# Patient Record
Sex: MALE | Race: WHITE | NOT HISPANIC OR LATINO | Employment: PART TIME | URBAN - METROPOLITAN AREA
[De-identification: names, ages, dates, MRNs, and addresses within clinical notes are randomized per-mention and may not be internally consistent; named-entity substitution may affect disease eponyms.]

---

## 2023-12-11 ENCOUNTER — APPOINTMENT (OUTPATIENT)
Dept: CARDIOLOGY | Facility: HOSPITAL | Age: 28
End: 2023-12-11
Payer: COMMERCIAL

## 2023-12-11 ENCOUNTER — APPOINTMENT (OUTPATIENT)
Dept: RADIOLOGY | Facility: HOSPITAL | Age: 28
End: 2023-12-11
Payer: COMMERCIAL

## 2023-12-11 ENCOUNTER — HOSPITAL ENCOUNTER (EMERGENCY)
Facility: HOSPITAL | Age: 28
Discharge: HOME | End: 2023-12-11
Attending: STUDENT IN AN ORGANIZED HEALTH CARE EDUCATION/TRAINING PROGRAM
Payer: COMMERCIAL

## 2023-12-11 VITALS
DIASTOLIC BLOOD PRESSURE: 92 MMHG | BODY MASS INDEX: 44.1 KG/M2 | OXYGEN SATURATION: 98 % | TEMPERATURE: 98.4 F | WEIGHT: 315 LBS | RESPIRATION RATE: 18 BRPM | HEIGHT: 71 IN | SYSTOLIC BLOOD PRESSURE: 175 MMHG | HEART RATE: 84 BPM

## 2023-12-11 DIAGNOSIS — R07.9 CHEST PAIN, UNSPECIFIED TYPE: Primary | ICD-10-CM

## 2023-12-11 LAB
ALBUMIN SERPL BCP-MCNC: 3.8 G/DL (ref 3.4–5)
ALP SERPL-CCNC: 99 U/L (ref 33–120)
ALT SERPL W P-5'-P-CCNC: 19 U/L (ref 10–52)
ANION GAP SERPL CALC-SCNC: 8 MMOL/L (ref 10–20)
AST SERPL W P-5'-P-CCNC: 13 U/L (ref 9–39)
BASOPHILS # BLD AUTO: 0.04 X10*3/UL (ref 0–0.1)
BASOPHILS NFR BLD AUTO: 0.3 %
BILIRUB SERPL-MCNC: 0.7 MG/DL (ref 0–1.2)
BUN SERPL-MCNC: 10 MG/DL (ref 6–23)
CALCIUM SERPL-MCNC: 8.9 MG/DL (ref 8.6–10.3)
CARDIAC TROPONIN I PNL SERPL HS: 4 NG/L (ref 0–20)
CARDIAC TROPONIN I PNL SERPL HS: 4 NG/L (ref 0–20)
CHLORIDE SERPL-SCNC: 102 MMOL/L (ref 98–107)
CO2 SERPL-SCNC: 31 MMOL/L (ref 21–32)
CREAT SERPL-MCNC: 0.83 MG/DL (ref 0.5–1.3)
EOSINOPHIL # BLD AUTO: 0.15 X10*3/UL (ref 0–0.7)
EOSINOPHIL NFR BLD AUTO: 1.3 %
ERYTHROCYTE [DISTWIDTH] IN BLOOD BY AUTOMATED COUNT: 12.8 % (ref 11.5–14.5)
GFR SERPL CREATININE-BSD FRML MDRD: >90 ML/MIN/1.73M*2
GLUCOSE SERPL-MCNC: 105 MG/DL (ref 74–99)
HCT VFR BLD AUTO: 41.8 % (ref 41–52)
HGB BLD-MCNC: 14.1 G/DL (ref 13.5–17.5)
IMM GRANULOCYTES # BLD AUTO: 0.05 X10*3/UL (ref 0–0.7)
IMM GRANULOCYTES NFR BLD AUTO: 0.4 % (ref 0–0.9)
LYMPHOCYTES # BLD AUTO: 2.49 X10*3/UL (ref 1.2–4.8)
LYMPHOCYTES NFR BLD AUTO: 21.5 %
MAGNESIUM SERPL-MCNC: 1.77 MG/DL (ref 1.6–2.4)
MCH RBC QN AUTO: 28.8 PG (ref 26–34)
MCHC RBC AUTO-ENTMCNC: 33.7 G/DL (ref 32–36)
MCV RBC AUTO: 85 FL (ref 80–100)
MONOCYTES # BLD AUTO: 0.59 X10*3/UL (ref 0.1–1)
MONOCYTES NFR BLD AUTO: 5.1 %
NEUTROPHILS # BLD AUTO: 8.24 X10*3/UL (ref 1.2–7.7)
NEUTROPHILS NFR BLD AUTO: 71.4 %
NRBC BLD-RTO: 0 /100 WBCS (ref 0–0)
PLATELET # BLD AUTO: 279 X10*3/UL (ref 150–450)
POTASSIUM SERPL-SCNC: 3.2 MMOL/L (ref 3.5–5.3)
PROT SERPL-MCNC: 7.4 G/DL (ref 6.4–8.2)
RBC # BLD AUTO: 4.9 X10*6/UL (ref 4.5–5.9)
SODIUM SERPL-SCNC: 138 MMOL/L (ref 136–145)
WBC # BLD AUTO: 11.6 X10*3/UL (ref 4.4–11.3)

## 2023-12-11 PROCEDURE — 85025 COMPLETE CBC W/AUTO DIFF WBC: CPT | Performed by: STUDENT IN AN ORGANIZED HEALTH CARE EDUCATION/TRAINING PROGRAM

## 2023-12-11 PROCEDURE — 71045 X-RAY EXAM CHEST 1 VIEW: CPT | Performed by: RADIOLOGY

## 2023-12-11 PROCEDURE — 2500000001 HC RX 250 WO HCPCS SELF ADMINISTERED DRUGS (ALT 637 FOR MEDICARE OP): Performed by: STUDENT IN AN ORGANIZED HEALTH CARE EDUCATION/TRAINING PROGRAM

## 2023-12-11 PROCEDURE — 71045 X-RAY EXAM CHEST 1 VIEW: CPT | Mod: FY

## 2023-12-11 PROCEDURE — 36415 COLL VENOUS BLD VENIPUNCTURE: CPT | Performed by: STUDENT IN AN ORGANIZED HEALTH CARE EDUCATION/TRAINING PROGRAM

## 2023-12-11 PROCEDURE — 83735 ASSAY OF MAGNESIUM: CPT | Performed by: STUDENT IN AN ORGANIZED HEALTH CARE EDUCATION/TRAINING PROGRAM

## 2023-12-11 PROCEDURE — 99283 EMERGENCY DEPT VISIT LOW MDM: CPT | Mod: 25

## 2023-12-11 PROCEDURE — 84484 ASSAY OF TROPONIN QUANT: CPT | Performed by: STUDENT IN AN ORGANIZED HEALTH CARE EDUCATION/TRAINING PROGRAM

## 2023-12-11 PROCEDURE — 80053 COMPREHEN METABOLIC PANEL: CPT | Performed by: STUDENT IN AN ORGANIZED HEALTH CARE EDUCATION/TRAINING PROGRAM

## 2023-12-11 PROCEDURE — 93005 ELECTROCARDIOGRAM TRACING: CPT

## 2023-12-11 RX ORDER — ALUMINUM HYDROXIDE, MAGNESIUM HYDROXIDE, AND SIMETHICONE 1200; 120; 1200 MG/30ML; MG/30ML; MG/30ML
20 SUSPENSION ORAL ONCE
Status: COMPLETED | OUTPATIENT
Start: 2023-12-11 | End: 2023-12-11

## 2023-12-11 RX ORDER — POTASSIUM CHLORIDE 1.5 G/1.58G
20 POWDER, FOR SOLUTION ORAL ONCE
Status: COMPLETED | OUTPATIENT
Start: 2023-12-11 | End: 2023-12-11

## 2023-12-11 RX ADMIN — ALUMINUM HYDROXIDE, MAGNESIUM HYDROXIDE, AND SIMETHICONE 20 ML: 200; 200; 20 SUSPENSION ORAL at 20:23

## 2023-12-11 RX ADMIN — POTASSIUM CHLORIDE 20 MEQ: 1.5 POWDER, FOR SOLUTION ORAL at 20:20

## 2023-12-11 ASSESSMENT — PAIN - FUNCTIONAL ASSESSMENT: PAIN_FUNCTIONAL_ASSESSMENT: 0-10

## 2023-12-11 ASSESSMENT — COLUMBIA-SUICIDE SEVERITY RATING SCALE - C-SSRS
6. HAVE YOU EVER DONE ANYTHING, STARTED TO DO ANYTHING, OR PREPARED TO DO ANYTHING TO END YOUR LIFE?: NO
2. HAVE YOU ACTUALLY HAD ANY THOUGHTS OF KILLING YOURSELF?: NO
1. IN THE PAST MONTH, HAVE YOU WISHED YOU WERE DEAD OR WISHED YOU COULD GO TO SLEEP AND NOT WAKE UP?: NO

## 2023-12-11 ASSESSMENT — PAIN SCALES - GENERAL
PAINLEVEL_OUTOF10: 2
PAINLEVEL_OUTOF10: 4

## 2023-12-11 ASSESSMENT — HEART SCORE
TROPONIN: LESS THAN OR EQUAL TO NORMAL LIMIT
AGE: <45
RISK FACTORS: 1-2 RISK FACTORS
ECG: NORMAL
HISTORY: SLIGHTLY SUSPICIOUS
HEART SCORE: 1

## 2023-12-11 ASSESSMENT — PAIN DESCRIPTION - LOCATION: LOCATION: CHEST

## 2023-12-11 ASSESSMENT — LIFESTYLE VARIABLES
HAVE YOU EVER FELT YOU SHOULD CUT DOWN ON YOUR DRINKING: NO
EVER HAD A DRINK FIRST THING IN THE MORNING TO STEADY YOUR NERVES TO GET RID OF A HANGOVER: NO
EVER FELT BAD OR GUILTY ABOUT YOUR DRINKING: NO
HAVE PEOPLE ANNOYED YOU BY CRITICIZING YOUR DRINKING: NO
REASON UNABLE TO ASSESS: NO

## 2023-12-11 ASSESSMENT — PAIN DESCRIPTION - PAIN TYPE: TYPE: ACUTE PAIN

## 2023-12-11 ASSESSMENT — PAIN DESCRIPTION - DESCRIPTORS: DESCRIPTORS: PRESSURE

## 2023-12-11 NOTE — ED PROVIDER NOTES
HPI   No chief complaint on file.      This is a 28-year-old male with a history of GERD, depression, hypertension, prediabetes, elevated BMI of 46, presenting with complaints of chest pain.  Chest pains of been intermittent throughout the day, diffuse.  No vomiting or diaphoresis.  No shortness of breath.  No prior history of cardiac stents.  Patient reports that he was told by his primary doctor to come to the emergency department for further evaluation of chest pains.  Patient reports he is also scheduled to have an EGD performed soon to evaluate his GERD.  No unilateral leg swelling, no hemoptysis, no recent trauma or surgery, no history of prior DVT or PE, no exogenous estrogen use.      History provided by:  Patient                      Carlita Coma Scale Score: 15   HEART Score: 1                Patient History   Past Medical History:   Diagnosis Date    Coronary artery disease     Depression     Hypertension      History reviewed. No pertinent surgical history.  No family history on file.  Social History     Tobacco Use    Smoking status: Never     Passive exposure: Never    Smokeless tobacco: Never   Vaping Use    Vaping Use: Some days    Substances: Nicotine    Devices: Disposable    Passive vaping exposure: Yes   Substance Use Topics    Alcohol use: Never    Drug use: Never       Physical Exam   ED Triage Vitals [12/11/23 1813]   Temp Heart Rate Resp BP   36.9 °C (98.4 °F) 86 18 (!) 165/98      SpO2 Temp Source Heart Rate Source Patient Position   98 % Tympanic -- --      BP Location FiO2 (%)     -- --       Physical Exam  Vitals and nursing note reviewed.   Constitutional:       General: He is not in acute distress.  HENT:      Head: Atraumatic.      Mouth/Throat:      Mouth: Mucous membranes are moist.      Pharynx: Oropharynx is clear.   Eyes:      Extraocular Movements: Extraocular movements intact.      Conjunctiva/sclera: Conjunctivae normal.      Pupils: Pupils are equal, round, and reactive to  light.   Cardiovascular:      Rate and Rhythm: Normal rate and regular rhythm.      Pulses: Normal pulses.   Pulmonary:      Effort: Pulmonary effort is normal. No respiratory distress.      Breath sounds: Normal breath sounds.   Abdominal:      General: There is no distension.      Palpations: Abdomen is soft.      Tenderness: There is no abdominal tenderness. There is no guarding or rebound.   Musculoskeletal:         General: No deformity.      Cervical back: Neck supple.   Skin:     General: Skin is warm and dry.   Neurological:      Mental Status: He is alert and oriented to person, place, and time. Mental status is at baseline.      Cranial Nerves: No cranial nerve deficit.      Sensory: No sensory deficit.      Motor: No weakness.   Psychiatric:         Mood and Affect: Mood normal.         Behavior: Behavior normal.         ED Course & MDM   Diagnoses as of 12/11/23 2054   Chest pain, unspecified type     Labs Reviewed   CBC WITH AUTO DIFFERENTIAL - Abnormal       Result Value    WBC 11.6 (*)     nRBC 0.0      RBC 4.90      Hemoglobin 14.1      Hematocrit 41.8      MCV 85      MCH 28.8      MCHC 33.7      RDW 12.8      Platelets 279      Neutrophils % 71.4      Immature Granulocytes %, Automated 0.4      Lymphocytes % 21.5      Monocytes % 5.1      Eosinophils % 1.3      Basophils % 0.3      Neutrophils Absolute 8.24 (*)     Immature Granulocytes Absolute, Automated 0.05      Lymphocytes Absolute 2.49      Monocytes Absolute 0.59      Eosinophils Absolute 0.15      Basophils Absolute 0.04     COMPREHENSIVE METABOLIC PANEL - Abnormal    Glucose 105 (*)     Sodium 138      Potassium 3.2 (*)     Chloride 102      Bicarbonate 31      Anion Gap 8 (*)     Urea Nitrogen 10      Creatinine 0.83      eGFR >90      Calcium 8.9      Albumin 3.8      Alkaline Phosphatase 99      Total Protein 7.4      AST 13      Bilirubin, Total 0.7      ALT 19     MAGNESIUM - Normal    Magnesium 1.77     SERIAL TROPONIN-INITIAL -  Normal    Troponin I, High Sensitivity 4      Narrative:     Less than 99th percentile of normal range cutoff-  Female and children under 18 years old <14 ng/L; Male <21 ng/L: Negative  Repeat testing should be performed if clinically indicated.     Female and children under 18 years old 14-50 ng/L; Male 21-50 ng/L:  Consistent with possible cardiac damage and possible increased clinical   risk. Serial measurements may help to assess extent of myocardial damage.     >50 ng/L: Consistent with cardiac damage, increased clinical risk and  myocardial infarction. Serial measurements may help assess extent of   myocardial damage.      NOTE: Children less than 1 year old may have higher baseline troponin   levels and results should be interpreted in conjunction with the overall   clinical context.     NOTE: Troponin I testing is performed using a different   testing methodology at [facility] than at other   [facility]. Direct result comparisons should only   be made within the same method.   SERIAL TROPONIN, 1 HOUR - Normal    Troponin I, High Sensitivity 4      Narrative:     Less than 99th percentile of normal range cutoff-  Female and children under 18 years old <14 ng/L; Male <21 ng/L: Negative  Repeat testing should be performed if clinically indicated.     Female and children under 18 years old 14-50 ng/L; Male 21-50 ng/L:  Consistent with possible cardiac damage and possible increased clinical   risk. Serial measurements may help to assess extent of myocardial damage.     >50 ng/L: Consistent with cardiac damage, increased clinical risk and  myocardial infarction. Serial measurements may help assess extent of   myocardial damage.      NOTE: Children less than 1 year old may have higher baseline troponin   levels and results should be interpreted in conjunction with the overall   clinical context.     NOTE: Troponin I testing is performed using a different   testing methodology at [facility] than at other   system hospitals. Direct result comparisons should only   be made within the same method.   TROPONIN SERIES- (INITIAL, 1 HR)    Narrative:     The following orders were created for panel order Troponin I Series, High Sensitivity (0, 1 HR).  Procedure                               Abnormality         Status                     ---------                               -----------         ------                     Troponin I, High Sensiti...[[id number]]  Normal              Final result               Troponin, High Sensitivi...[[id number]]  Normal              Final result                 Please view results for these tests on the individual orders.     XR chest 1 view   Final Result   1.  Enlarged cardiac silhouette with pulmonary vascular congestion             MACRO:   None        Signed by: Randy Briggs 12/11/2023 7:35 PM   Dictation workstation:   [id number]          Medical Decision Making  This is a 28-year-old male with a history of obesity, GERD, hypertension, prediabetes presenting with intermittent chest pains throughout the day.  On evaluation patient is mildly hypertensive with blood pressure 165/98, otherwise hemodynamically stable.  Equal pulses, normal breath sounds.  Patient is PERC negative, do not suspect PE.  Diagnostic workup performed to rule out ACS.    Patient's workup is overall unremarkable, low risk heart score.  Patient stable for discharge home with continued outpatient workup with primary care.  Suspect patient's symptoms are possibly related to GERD.  Patient has an outpatient EGD scheduled.  Patient advised to continue following up for management of his blood pressure with primary care.    Problems Addressed:  Chest pain, unspecified type: acute illness or injury    Amount and/or Complexity of Data Reviewed  Labs: ordered. Decision-making details documented in ED Course.     Details: Labwork reviewed.  Blood cell count 11.6, normal H&H.  Metabolic panel  with normal renal function, mild hypokalemia with potassium 3.2, this was repleted in the ED with oral potassium.  Glucose mildly elevated at 105.  Otherwise unremarkable metabolic panel.  Troponin negative x 2.  Do not suspect ACS.  Radiology: ordered. Decision-making details documented in ED Course.     Details: Chest x-ray with mildly enlarged cardiac silhouette and pulmonary vascular congestion without overt edema, no infiltrate, no pneumothorax.  ECG/medicine tests: ordered and independent interpretation performed. Decision-making details documented in ED Course.     Details: Twelve-lead ECG obtained at 1811 by my interpretation demonstrates normal sinus rhythm with a rate of 87, no acute ST elevation or depression.  No other acute ischemic changes.    Repeat twelve-lead ECG was obtained at 2027 and by my interpretation demonstrates normal sinus rhythm with a rate of 89, no acute ST elevation or depression.        Procedure  Procedures     Jordi Rodriguez MD  12/11/23 2054

## 2023-12-11 NOTE — Clinical Note
Michael Mckinney was seen and treated in our emergency department on 12/11/2023.  He may return to work on 12/12/2023.       If you have any questions or concerns, please don't hesitate to call.      Jordi Rodriguez MD

## 2023-12-12 ENCOUNTER — HOSPITAL ENCOUNTER (OUTPATIENT)
Dept: CARDIOLOGY | Facility: HOSPITAL | Age: 28
Discharge: HOME | End: 2023-12-12
Payer: COMMERCIAL

## 2023-12-12 PROCEDURE — 93005 ELECTROCARDIOGRAM TRACING: CPT
